# Patient Record
Sex: FEMALE | Race: BLACK OR AFRICAN AMERICAN | NOT HISPANIC OR LATINO | URBAN - METROPOLITAN AREA
[De-identification: names, ages, dates, MRNs, and addresses within clinical notes are randomized per-mention and may not be internally consistent; named-entity substitution may affect disease eponyms.]

---

## 2021-05-21 ENCOUNTER — INPATIENT (INPATIENT)
Facility: HOSPITAL | Age: 41
LOS: 1 days | Discharge: HOME | DRG: 872 | End: 2021-05-23
Attending: STUDENT IN AN ORGANIZED HEALTH CARE EDUCATION/TRAINING PROGRAM | Admitting: STUDENT IN AN ORGANIZED HEALTH CARE EDUCATION/TRAINING PROGRAM
Payer: SELF-PAY

## 2021-05-21 VITALS
HEIGHT: 66 IN | RESPIRATION RATE: 18 BRPM | DIASTOLIC BLOOD PRESSURE: 84 MMHG | TEMPERATURE: 100 F | WEIGHT: 199.96 LBS | SYSTOLIC BLOOD PRESSURE: 119 MMHG | OXYGEN SATURATION: 100 % | HEART RATE: 101 BPM

## 2021-05-21 DIAGNOSIS — R63.8 OTHER SYMPTOMS AND SIGNS CONCERNING FOOD AND FLUID INTAKE: ICD-10-CM

## 2021-05-21 DIAGNOSIS — N12 TUBULO-INTERSTITIAL NEPHRITIS, NOT SPECIFIED AS ACUTE OR CHRONIC: ICD-10-CM

## 2021-05-21 DIAGNOSIS — K40.90 UNILATERAL INGUINAL HERNIA, WITHOUT OBSTRUCTION OR GANGRENE, NOT SPECIFIED AS RECURRENT: Chronic | ICD-10-CM

## 2021-05-21 DIAGNOSIS — A41.9 SEPSIS, UNSPECIFIED ORGANISM: ICD-10-CM

## 2021-05-21 LAB
ALBUMIN SERPL ELPH-MCNC: 3.8 G/DL — SIGNIFICANT CHANGE UP (ref 3.3–5)
ALP SERPL-CCNC: 88 U/L — SIGNIFICANT CHANGE UP (ref 40–120)
ALT FLD-CCNC: 56 U/L — HIGH (ref 10–45)
ANION GAP SERPL CALC-SCNC: 13 MMOL/L — SIGNIFICANT CHANGE UP (ref 5–17)
APPEARANCE UR: CLEAR — SIGNIFICANT CHANGE UP
AST SERPL-CCNC: 43 U/L — HIGH (ref 10–40)
BACTERIA # UR AUTO: PRESENT /HPF
BASOPHILS # BLD AUTO: 0 K/UL — SIGNIFICANT CHANGE UP (ref 0–0.2)
BASOPHILS NFR BLD AUTO: 0 % — SIGNIFICANT CHANGE UP (ref 0–2)
BILIRUB SERPL-MCNC: 1 MG/DL — SIGNIFICANT CHANGE UP (ref 0.2–1.2)
BILIRUB UR-MCNC: ABNORMAL
BUN SERPL-MCNC: 11 MG/DL — SIGNIFICANT CHANGE UP (ref 7–23)
BURR CELLS BLD QL SMEAR: PRESENT — SIGNIFICANT CHANGE UP
CALCIUM SERPL-MCNC: 9.4 MG/DL — SIGNIFICANT CHANGE UP (ref 8.4–10.5)
CHLORIDE SERPL-SCNC: 95 MMOL/L — LOW (ref 96–108)
CO2 SERPL-SCNC: 25 MMOL/L — SIGNIFICANT CHANGE UP (ref 22–31)
COLOR SPEC: YELLOW — SIGNIFICANT CHANGE UP
CREAT SERPL-MCNC: 1.38 MG/DL — HIGH (ref 0.5–1.3)
DIFF PNL FLD: ABNORMAL
EOSINOPHIL # BLD AUTO: 0 K/UL — SIGNIFICANT CHANGE UP (ref 0–0.5)
EOSINOPHIL NFR BLD AUTO: 0 % — SIGNIFICANT CHANGE UP (ref 0–6)
EPI CELLS # UR: SIGNIFICANT CHANGE UP /HPF (ref 0–5)
GIANT PLATELETS BLD QL SMEAR: PRESENT — SIGNIFICANT CHANGE UP
GLUCOSE SERPL-MCNC: 131 MG/DL — HIGH (ref 70–99)
GLUCOSE UR QL: NEGATIVE — SIGNIFICANT CHANGE UP
HCT VFR BLD CALC: 33 % — LOW (ref 34.5–45)
HGB BLD-MCNC: 10.4 G/DL — LOW (ref 11.5–15.5)
KETONES UR-MCNC: 15 MG/DL
LACTATE SERPL-SCNC: 1.1 MMOL/L — SIGNIFICANT CHANGE UP (ref 0.5–2)
LEUKOCYTE ESTERASE UR-ACNC: ABNORMAL
LYMPHOCYTES # BLD AUTO: 27 % — SIGNIFICANT CHANGE UP (ref 13–44)
LYMPHOCYTES # BLD AUTO: 4.58 K/UL — HIGH (ref 1–3.3)
MANUAL SMEAR VERIFICATION: SIGNIFICANT CHANGE UP
MCHC RBC-ENTMCNC: 27.3 PG — SIGNIFICANT CHANGE UP (ref 27–34)
MCHC RBC-ENTMCNC: 31.5 GM/DL — LOW (ref 32–36)
MCV RBC AUTO: 86.6 FL — SIGNIFICANT CHANGE UP (ref 80–100)
MONOCYTES # BLD AUTO: 1.17 K/UL — HIGH (ref 0–0.9)
MONOCYTES NFR BLD AUTO: 6.9 % — SIGNIFICANT CHANGE UP (ref 2–14)
NEUTROPHILS # BLD AUTO: 11.21 K/UL — HIGH (ref 1.8–7.4)
NEUTROPHILS NFR BLD AUTO: 65.2 % — SIGNIFICANT CHANGE UP (ref 43–77)
NEUTS BAND # BLD: 0.9 % — SIGNIFICANT CHANGE UP (ref 0–8)
NITRITE UR-MCNC: NEGATIVE — SIGNIFICANT CHANGE UP
PH UR: 6 — SIGNIFICANT CHANGE UP (ref 5–8)
PLAT MORPH BLD: ABNORMAL
PLATELET # BLD AUTO: 303 K/UL — SIGNIFICANT CHANGE UP (ref 150–400)
POIKILOCYTOSIS BLD QL AUTO: SLIGHT — SIGNIFICANT CHANGE UP
POLYCHROMASIA BLD QL SMEAR: SLIGHT — SIGNIFICANT CHANGE UP
POTASSIUM SERPL-MCNC: 3.8 MMOL/L — SIGNIFICANT CHANGE UP (ref 3.5–5.3)
POTASSIUM SERPL-SCNC: 3.8 MMOL/L — SIGNIFICANT CHANGE UP (ref 3.5–5.3)
PROT SERPL-MCNC: 7.9 G/DL — SIGNIFICANT CHANGE UP (ref 6–8.3)
PROT UR-MCNC: 100 MG/DL
RBC # BLD: 3.81 M/UL — SIGNIFICANT CHANGE UP (ref 3.8–5.2)
RBC # FLD: 15.2 % — HIGH (ref 10.3–14.5)
RBC BLD AUTO: ABNORMAL
RBC CASTS # UR COMP ASSIST: < 5 /HPF — SIGNIFICANT CHANGE UP
SARS-COV-2 RNA SPEC QL NAA+PROBE: SIGNIFICANT CHANGE UP
SODIUM SERPL-SCNC: 133 MMOL/L — LOW (ref 135–145)
SP GR SPEC: 1.01 — SIGNIFICANT CHANGE UP (ref 1–1.03)
UROBILINOGEN FLD QL: 1 E.U./DL — SIGNIFICANT CHANGE UP
WBC # BLD: 16.96 K/UL — HIGH (ref 3.8–10.5)
WBC # FLD AUTO: 16.96 K/UL — HIGH (ref 3.8–10.5)
WBC UR QL: ABNORMAL /HPF

## 2021-05-21 PROCEDURE — 99285 EMERGENCY DEPT VISIT HI MDM: CPT

## 2021-05-21 PROCEDURE — G1004: CPT

## 2021-05-21 PROCEDURE — 99223 1ST HOSP IP/OBS HIGH 75: CPT | Mod: GC

## 2021-05-21 PROCEDURE — 74176 CT ABD & PELVIS W/O CONTRAST: CPT | Mod: 26,MG

## 2021-05-21 RX ORDER — ACETAMINOPHEN 500 MG
650 TABLET ORAL EVERY 6 HOURS
Refills: 0 | Status: DISCONTINUED | OUTPATIENT
Start: 2021-05-21 | End: 2021-05-23

## 2021-05-21 RX ORDER — MORPHINE SULFATE 50 MG/1
4 CAPSULE, EXTENDED RELEASE ORAL ONCE
Refills: 0 | Status: DISCONTINUED | OUTPATIENT
Start: 2021-05-21 | End: 2021-05-21

## 2021-05-21 RX ORDER — SODIUM CHLORIDE 9 MG/ML
500 INJECTION INTRAMUSCULAR; INTRAVENOUS; SUBCUTANEOUS ONCE
Refills: 0 | Status: COMPLETED | OUTPATIENT
Start: 2021-05-21 | End: 2021-05-21

## 2021-05-21 RX ORDER — CEFTRIAXONE 500 MG/1
1000 INJECTION, POWDER, FOR SOLUTION INTRAMUSCULAR; INTRAVENOUS ONCE
Refills: 0 | Status: COMPLETED | OUTPATIENT
Start: 2021-05-21 | End: 2021-05-21

## 2021-05-21 RX ORDER — ACETAMINOPHEN 500 MG
650 TABLET ORAL ONCE
Refills: 0 | Status: COMPLETED | OUTPATIENT
Start: 2021-05-21 | End: 2021-05-21

## 2021-05-21 RX ORDER — CEFTRIAXONE 500 MG/1
2000 INJECTION, POWDER, FOR SOLUTION INTRAMUSCULAR; INTRAVENOUS EVERY 24 HOURS
Refills: 0 | Status: DISCONTINUED | OUTPATIENT
Start: 2021-05-22 | End: 2021-05-23

## 2021-05-21 RX ORDER — ONDANSETRON 8 MG/1
4 TABLET, FILM COATED ORAL ONCE
Refills: 0 | Status: COMPLETED | OUTPATIENT
Start: 2021-05-21 | End: 2021-05-21

## 2021-05-21 RX ORDER — SODIUM CHLORIDE 9 MG/ML
1000 INJECTION INTRAMUSCULAR; INTRAVENOUS; SUBCUTANEOUS ONCE
Refills: 0 | Status: COMPLETED | OUTPATIENT
Start: 2021-05-21 | End: 2021-05-21

## 2021-05-21 RX ADMIN — MORPHINE SULFATE 4 MILLIGRAM(S): 50 CAPSULE, EXTENDED RELEASE ORAL at 17:12

## 2021-05-21 RX ADMIN — MORPHINE SULFATE 4 MILLIGRAM(S): 50 CAPSULE, EXTENDED RELEASE ORAL at 16:42

## 2021-05-21 RX ADMIN — ONDANSETRON 4 MILLIGRAM(S): 8 TABLET, FILM COATED ORAL at 16:42

## 2021-05-21 RX ADMIN — Medication 650 MILLIGRAM(S): at 23:55

## 2021-05-21 RX ADMIN — CEFTRIAXONE 100 MILLIGRAM(S): 500 INJECTION, POWDER, FOR SOLUTION INTRAMUSCULAR; INTRAVENOUS at 18:01

## 2021-05-21 RX ADMIN — SODIUM CHLORIDE 1000 MILLILITER(S): 9 INJECTION INTRAMUSCULAR; INTRAVENOUS; SUBCUTANEOUS at 16:42

## 2021-05-21 RX ADMIN — SODIUM CHLORIDE 1000 MILLILITER(S): 9 INJECTION INTRAMUSCULAR; INTRAVENOUS; SUBCUTANEOUS at 20:49

## 2021-05-21 RX ADMIN — MORPHINE SULFATE 4 MILLIGRAM(S): 50 CAPSULE, EXTENDED RELEASE ORAL at 18:25

## 2021-05-21 RX ADMIN — SODIUM CHLORIDE 1000 MILLILITER(S): 9 INJECTION INTRAMUSCULAR; INTRAVENOUS; SUBCUTANEOUS at 18:08

## 2021-05-21 RX ADMIN — CEFTRIAXONE 100 MILLIGRAM(S): 500 INJECTION, POWDER, FOR SOLUTION INTRAMUSCULAR; INTRAVENOUS at 22:39

## 2021-05-21 RX ADMIN — Medication 650 MILLIGRAM(S): at 20:01

## 2021-05-21 RX ADMIN — Medication 650 MILLIGRAM(S): at 20:30

## 2021-05-21 NOTE — ED ADULT TRIAGE NOTE - CHIEF COMPLAINT QUOTE
Pt BIBA for x5 days of abdominal pain. Endorses +N/V/D, +chills at night. Endorses discomfort when urinating. Denies CP or SOB.

## 2021-05-21 NOTE — H&P ADULT - PROBLEM SELECTOR PLAN 1
-sepsis w/ pyelonephritis on CT a/p right side  -u/a positive  -s/p 2 L giving another 500 cc  -ceftriaxone 2 gm Q24 for now  -f/u urine cx and bld cx

## 2021-05-21 NOTE — H&P ADULT - ASSESSMENT
Patient is a 40 year old female no past medical history or surgical history who presented to the ED with abdominal pain, vomiting, and slight loose stool for the past 5 days.

## 2021-05-21 NOTE — H&P ADULT - HISTORY OF PRESENT ILLNESS
Patient is a 40 year old female no past medical history or surgical history who presented to the ED with abdominal pain, vomiting, and slight loose stool for the past 5 days. She states no undercooked meals during this time. Describes pain in her upper stomach to back. She has dysuria as well, and urinary frequency as well. Denies sick contacts, fevers, chills, nausea, vomiting, or diarrhea.     ED: 103 F, , /83, RR 18.  s/p 2L s/p 1 gm ceftriaxone.

## 2021-05-21 NOTE — H&P ADULT - NSHPLABSRESULTS_GEN_ALL_CORE
LABS:                         10.4   16.96 )-----------( 303      ( 21 May 2021 15:43 )             33.0         133<L>  |  95<L>  |  11  ----------------------------<  131<H>  3.8   |  25  |  1.38<H>    Ca    9.4      21 May 2021 15:43    TPro  7.9  /  Alb  3.8  /  TBili  1.0  /  DBili  x   /  AST  43<H>  /  ALT  56<H>  /  AlkPhos  88        Urinalysis Basic - ( 21 May 2021 16:45 )    Color: Yellow / Appearance: Clear / S.015 / pH: x  Gluc: x / Ketone: 15 mg/dL  / Bili: Small / Urobili: 1.0 E.U./dL   Blood: x / Protein: 100 mg/dL / Nitrite: NEGATIVE   Leuk Esterase: Trace / RBC: < 5 /HPF / WBC Many /HPF   Sq Epi: x / Non Sq Epi: 0-5 /HPF / Bacteria: Present /HPF    Lactate, Blood: 1.1 mmol/L ( @ 20:01)

## 2021-05-21 NOTE — ED PROVIDER NOTE - CLINICAL SUMMARY MEDICAL DECISION MAKING FREE TEXT BOX
41 y/o F with no PMHx presents w/ abdominal pain, vomiting, diarrhea, chills, dysuria, urinary frequency x 5d. Begun after eating takeout. Limited pO intake since. No SOB/cp/cough. Plan for labs, UA. Will give Zofran, Toradol, fluids. 41 y/o F with no PMHx presents w/ abdominal pain, vomiting, diarrhea, chills, dysuria, urinary frequency x 5d. Begun after eating takeout. Limited pO intake since. No SOB/cp/cough. Plan for labs, UA. Will give Zofran, Morphine for pain control. Fluids started upon arrival of patient to ED. 41 y/o F with no PMHx presents w/ abdominal pain, vomiting, diarrhea, chills, dysuria, urinary frequency x 5d. Begun after eating takeout. Limited pO intake since. No SOB/cp/cough. Plan for labs, UA. Will give Zofran, Morphine for pain control. Fluids started upon arrival of patient to ED.  urine positive for uti  because of persistent right sided pain, ct ordered ro stone/pyelo, iv abx started  ct shows pyelo, no stone  pt still in a fair amount of pain will admit

## 2021-05-21 NOTE — ED PROVIDER NOTE - OBJECTIVE STATEMENT
41 y/o F with no PMHx presents to the ED w/ abdominal pain, vomiting, diarrhea, chills x5d. Patient notes she ate [employer] noodles (no rice) prior to getting sick and noone else ate those noodles. Limited pO intake since. Pain is described to upper stomach and radiating to back. Also with associated dysuria, urinary frequency for the same duration of time. No sick contacts, fever, chest pain, SOB, cough, headache, or any other acute complaints.

## 2021-05-22 LAB
-  COAGULASE NEGATIVE STAPHYLOCOCCUS: SIGNIFICANT CHANGE UP
ALBUMIN SERPL ELPH-MCNC: 2.9 G/DL — LOW (ref 3.3–5)
ALP SERPL-CCNC: 83 U/L — SIGNIFICANT CHANGE UP (ref 40–120)
ALT FLD-CCNC: 41 U/L — SIGNIFICANT CHANGE UP (ref 10–45)
ANION GAP SERPL CALC-SCNC: 12 MMOL/L — SIGNIFICANT CHANGE UP (ref 5–17)
AST SERPL-CCNC: 31 U/L — SIGNIFICANT CHANGE UP (ref 10–40)
BASOPHILS # BLD AUTO: 0.03 K/UL — SIGNIFICANT CHANGE UP (ref 0–0.2)
BASOPHILS NFR BLD AUTO: 0.2 % — SIGNIFICANT CHANGE UP (ref 0–2)
BILIRUB SERPL-MCNC: 1 MG/DL — SIGNIFICANT CHANGE UP (ref 0.2–1.2)
BUN SERPL-MCNC: 8 MG/DL — SIGNIFICANT CHANGE UP (ref 7–23)
CALCIUM SERPL-MCNC: 8.6 MG/DL — SIGNIFICANT CHANGE UP (ref 8.4–10.5)
CHLORIDE SERPL-SCNC: 100 MMOL/L — SIGNIFICANT CHANGE UP (ref 96–108)
CO2 SERPL-SCNC: 22 MMOL/L — SIGNIFICANT CHANGE UP (ref 22–31)
CREAT SERPL-MCNC: 1 MG/DL — SIGNIFICANT CHANGE UP (ref 0.5–1.3)
EOSINOPHIL # BLD AUTO: 0.01 K/UL — SIGNIFICANT CHANGE UP (ref 0–0.5)
EOSINOPHIL NFR BLD AUTO: 0.1 % — SIGNIFICANT CHANGE UP (ref 0–6)
GLUCOSE SERPL-MCNC: 159 MG/DL — HIGH (ref 70–99)
GRAM STN FLD: SIGNIFICANT CHANGE UP
HCT VFR BLD CALC: 28.8 % — LOW (ref 34.5–45)
HGB BLD-MCNC: 9.1 G/DL — LOW (ref 11.5–15.5)
IMM GRANULOCYTES NFR BLD AUTO: 1.9 % — HIGH (ref 0–1.5)
LYMPHOCYTES # BLD AUTO: 16.5 % — SIGNIFICANT CHANGE UP (ref 13–44)
LYMPHOCYTES # BLD AUTO: 2.31 K/UL — SIGNIFICANT CHANGE UP (ref 1–3.3)
MAGNESIUM SERPL-MCNC: 2 MG/DL — SIGNIFICANT CHANGE UP (ref 1.6–2.6)
MCHC RBC-ENTMCNC: 27.3 PG — SIGNIFICANT CHANGE UP (ref 27–34)
MCHC RBC-ENTMCNC: 31.6 GM/DL — LOW (ref 32–36)
MCV RBC AUTO: 86.5 FL — SIGNIFICANT CHANGE UP (ref 80–100)
METHOD TYPE: SIGNIFICANT CHANGE UP
MONOCYTES # BLD AUTO: 1.74 K/UL — HIGH (ref 0–0.9)
MONOCYTES NFR BLD AUTO: 12.4 % — SIGNIFICANT CHANGE UP (ref 2–14)
NEUTROPHILS # BLD AUTO: 9.63 K/UL — HIGH (ref 1.8–7.4)
NEUTROPHILS NFR BLD AUTO: 68.9 % — SIGNIFICANT CHANGE UP (ref 43–77)
NRBC # BLD: 0 /100 WBCS — SIGNIFICANT CHANGE UP (ref 0–0)
PLATELET # BLD AUTO: 268 K/UL — SIGNIFICANT CHANGE UP (ref 150–400)
POTASSIUM SERPL-MCNC: 3.7 MMOL/L — SIGNIFICANT CHANGE UP (ref 3.5–5.3)
POTASSIUM SERPL-SCNC: 3.7 MMOL/L — SIGNIFICANT CHANGE UP (ref 3.5–5.3)
PROT SERPL-MCNC: 6.5 G/DL — SIGNIFICANT CHANGE UP (ref 6–8.3)
RBC # BLD: 3.33 M/UL — LOW (ref 3.8–5.2)
RBC # FLD: 15.3 % — HIGH (ref 10.3–14.5)
SODIUM SERPL-SCNC: 134 MMOL/L — LOW (ref 135–145)
WBC # BLD: 13.99 K/UL — HIGH (ref 3.8–10.5)
WBC # FLD AUTO: 13.99 K/UL — HIGH (ref 3.8–10.5)

## 2021-05-22 PROCEDURE — 99232 SBSQ HOSP IP/OBS MODERATE 35: CPT

## 2021-05-22 RX ORDER — POTASSIUM CHLORIDE 20 MEQ
40 PACKET (EA) ORAL ONCE
Refills: 0 | Status: COMPLETED | OUTPATIENT
Start: 2021-05-22 | End: 2021-05-22

## 2021-05-22 RX ORDER — VANCOMYCIN HCL 1 G
1500 VIAL (EA) INTRAVENOUS EVERY 12 HOURS
Refills: 0 | Status: DISCONTINUED | OUTPATIENT
Start: 2021-05-22 | End: 2021-05-23

## 2021-05-22 RX ADMIN — Medication 40 MILLIEQUIVALENT(S): at 17:56

## 2021-05-22 RX ADMIN — Medication 650 MILLIGRAM(S): at 12:00

## 2021-05-22 RX ADMIN — Medication 650 MILLIGRAM(S): at 00:55

## 2021-05-22 RX ADMIN — Medication 650 MILLIGRAM(S): at 06:15

## 2021-05-22 RX ADMIN — Medication 650 MILLIGRAM(S): at 05:16

## 2021-05-22 RX ADMIN — Medication 650 MILLIGRAM(S): at 12:30

## 2021-05-22 RX ADMIN — CEFTRIAXONE 100 MILLIGRAM(S): 500 INJECTION, POWDER, FOR SOLUTION INTRAMUSCULAR; INTRAVENOUS at 17:20

## 2021-05-22 RX ADMIN — Medication 650 MILLIGRAM(S): at 21:12

## 2021-05-22 RX ADMIN — Medication 650 MILLIGRAM(S): at 22:00

## 2021-05-22 NOTE — DISCHARGE NOTE PROVIDER - CARE PROVIDER_API CALL
Linda Mullenhalie  [place], [place]  Phone: [phone]  Fax: (   )    -  Established Patient  Follow Up Time: 2 weeks

## 2021-05-22 NOTE — PROGRESS NOTE ADULT - SUBJECTIVE AND OBJECTIVE BOX
O/N: No acute events. Admitted, see HnP.    Subjective: Patient seen and examined at bedside. Denies fever/chills, chest pain, shortness of breath, abdominal pain, diarrhea. Admits to R flank pain and recent hx of dysuria.    VITAL SIGNS:  T(F): 100.3 (21 @ 05:57)  HR: 105 (21 @ 05:57)  BP: 123/82 (21 @ 05:57)  RR: 18 (21 @ 05:57)  SpO2: 100% (21 @ 05:57)  Wt(kg): --    PHYSICAL EXAM:  Neurologic: AAOx3; CNII-XII grossly intact; no focal deficits  Psychiatric: affect and characteristics of appearance, verbalizations, behaviors are appropriate  Constitutional: WDWN resting comfortably in bed; NAD  Head: NC/AT  Eyes: PERRL, EOMI, clear conjunctiva  ENT: no nasal discharge; uvula midline, no oropharyngeal erythema or exudates; MMM  Neck: supple; no JVD or thyromegaly  Respiratory: CTA B/L, no retractions  Cardiac: +S1/S2; RRR; no M/R/G; PMI non-displaced  Gastrointestinal: soft, NT/ND; no rebound or guarding; +BSx4  Genitourinary: normal external genitalia  Back: spine midline, no bony tenderness or step-offs; no CVAT B/L  Extremities: no clubbing or cyanosis; no peripheral edema  Musculoskeletal: no joint swelling, tenderness or erythema  Vascular: 2+ radial, femoral, DP/PT pulses B/L  Dermatologic: skin warm, dry and intact; no rashes, wounds, or scars  Lymphatic: no submandibular or cervical LAD      MEDICATIONS  (STANDING):  cefTRIAXone   IVPB 2000 milliGRAM(s) IV Intermittent every 24 hours    MEDICATIONS  (PRN):  acetaminophen   Tablet .. 650 milliGRAM(s) Oral every 6 hours PRN Temp greater or equal to 38C (100.4F), Mild Pain (1 - 3)      Allergies    No Known Allergies    Intolerances        LABS:                        9.1    13.99 )-----------( 268      ( 22 May 2021 07:24 )             28.8         134<L>  |  100  |  8   ----------------------------<  159<H>  3.7   |  22  |  1.00    Ca    8.6      22 May 2021 07:24  Mg     2.0     -    TPro  6.5  /  Alb  2.9<L>  /  TBili  1.0  /  DBili  x   /  AST  31  /  ALT  41  /  AlkPhos  83  -      Urinalysis Basic - ( 21 May 2021 16:45 )    Color: Yellow / Appearance: Clear / S.015 / pH: x  Gluc: x / Ketone: 15 mg/dL  / Bili: Small / Urobili: 1.0 E.U./dL   Blood: x / Protein: 100 mg/dL / Nitrite: NEGATIVE   Leuk Esterase: Trace / RBC: < 5 /HPF / WBC Many /HPF   Sq Epi: x / Non Sq Epi: 0-5 /HPF / Bacteria: Present /HPF        RADIOLOGY & ADDITIONAL TESTS: Reviewed

## 2021-05-22 NOTE — PROGRESS NOTE ADULT - ATTENDING COMMENTS
f./u urine c/s  Monitor for sepsis  Discharge planning f./u urine c/s  Monitor for sepsis  PPI  Monitor Hb/hct and tranfuse as needed  Discharge planning

## 2021-05-22 NOTE — DISCHARGE NOTE PROVIDER - NSDCCPCAREPLAN_GEN_ALL_CORE_FT
PRINCIPAL DISCHARGE DIAGNOSIS  Diagnosis: Pyelonephritis of right kidney  Assessment and Plan of Treatment: You were noted to have an infection of the kidney, a condition called pyelonephritis during your admission to [facility]. This was found based on your symptom profile (urinary frequency and/or pain on urination) as well as your urine tested for any infectious organisms. You were received antibiotics throughout your hospital course. Please follow-up with your primary care physician. Should you notice any symptoms such as but not limited to: unrelenting/severe fever (temperatuer >103 F and/or lasting >3 days), worsening pain on urination, urinary discharge, increased urinary frequency, chills, severe flank/lower back pain, or bloody urine, please return to the emergency department for interval evaluation.  Please continue to take _______.         SECONDARY DISCHARGE DIAGNOSES  Diagnosis: Vomiting  Assessment and Plan of Treatment:      PRINCIPAL DISCHARGE DIAGNOSIS  Diagnosis: Pyelonephritis of right kidney  Assessment and Plan of Treatment: You were noted to have an infection of the kidney, a condition called pyelonephritis during your admission to [facility]. This was found based on your symptom profile (urinary frequency and/or pain on urination), imaging of your kidneys, as well as your urine tested for any infectious organisms. You were received antibiotics throughout your hospital course. Should you notice any symptoms such as but not limited to: unrelenting/severe fever (temperature >103 F and/or lasting >3 days), worsening pain on urination, urinary discharge, increased urinary frequency, chills, severe flank/lower back pain, or bloody urine, please return to the emergency department for interval evaluation.  Please continue to take Cefpodoxime 200mg every 12 hours for an additional 8 days (last day 5/30).   Please follow up with your PCP within 2 weeks of discharge.      SECONDARY DISCHARGE DIAGNOSES  Diagnosis: Anemia  Assessment and Plan of Treatment: Anemia is the medical term for when a person has too few red blood cells. Red blood cells are the cells in your blood that carry oxygen. If you have too few red blood cells, your body does not get all the oxygen it needs. Most people with anemia have no symptoms. They find out they have it after their doctor does blood tests for another reason. People who do have symptoms might feel tired or weak, especially if they try to exercise or have headaches. You were found to have anemia during this hospitalization. Please follow up with your PCP for further workup and management of your anemia.

## 2021-05-22 NOTE — DISCHARGE NOTE PROVIDER - NSDCFUADDAPPT_GEN_ALL_CORE_FT
Please make sure you follow up with your Primary Care Provider, AZUCENA Mullen, within 1-2 weeks in [place]. The office is closed at the time of discharge, therefore please make sure you call the office Monday morning (5/24) to schedule the appointment.

## 2021-05-22 NOTE — DISCHARGE NOTE PROVIDER - HOSPITAL COURSE
Patient is a 40 year old female no past medical history or surgical history who presented to the ED with abdominal pain, vomiting, and slight loose stool for the past 5 days due to pylonephritis. Started of CFTX, urine culture growing ______ de-escelated to _________.    #Sepsis, due to unspecified organism, unspecified whether acute organ dysfunction present  At presentation febrile to 103, tachycardic to 101. UA +. Sepsis w/ pyelonephritis on CT a/p right side. S/p 2.5 L. Denies PMH including hx of UTIs. Hepatobiliary etiology less likely given benign liver/GB findings on CT. Urine growing _______.  -c/w ______    #Pyelonephritis of right kidney  -as above.    #Discharge: do not delete    Patient is a 40 year old female no past medical history or surgical history who presented to the ED with abdominal pain, vomiting, and slight loose stool for the past 5 days, found to have sepsis 2/2 pyelonephritis.     Problem List/Main Diagnoses (system-based):   #Sepsis  At presentation febrile to 103, tachycardic to 101. WBC 17. UA +. Sepsis w/ pyelonephritis on CT a/p right side. S/p 2.5 L. Denies PMH including hx of UTIs. Hepatobiliary etiology less likely given benign liver/GB findings on CT.  - Started on Ceftriaxone 2 gm Q24 inpatient  - Urine cx growing E.coli, sensitive to Ceftriaxone   - Clinically improved after IV Ceftriaxone; will transition to Cefpodoxime 200mg q12h to complete 10 day course    #Pyelonephritis of right kidney  Plan as above     #Bacteremia  1/2 blood cultures on admission growing coagulase negative staph. Likely containment. Repeat blood culture obtained with NGTD.     #Anemia  - Hb 10.4 on admission, patient is unsure if she's been diagnosed with anemia in the past  - No signs of bleeding  - Outpatient workup     Inpatient treatment course: Patient is a 40 year old female no past medical history or surgical history who presented to the ED with abdominal pain, vomiting, and slight loose stool for the past 5 days, found to have sepsis 2/2 pyelonephritis. Started on Ceftriaxone 2 gm Q24. Patient's symptoms now improved, with WBC trending now, and remains hemodynamically stable. Urine cx growing E.Coli, sensitive to Ceftriaxone. Will transition to Cefpodoxime 200mg q12h to complete 10 day course. Stable for discharge home to complete course of antibiotics.     New medications: Cefpodoxmine 200mg every 12 hours for 8 more days (last day 5/30)  Labs to be followed outpatient: CBC to monitor anemia   Exam to be followed outpatient: -   #Discharge: do not delete    Patient is a 40 year old female no past medical history or surgical history who presented to the ED with abdominal pain, vomiting, and slight loose stool for the past 5 days, found to have sepsis 2/2 pyelonephritis.     Problem List/Main Diagnoses (system-based):   #Sepsis  At presentation febrile to 103, tachycardic to 101. WBC 17. UA +. Sepsis w/ pyelonephritis on CT a/p right side. S/p 2.5 L. Denies PMH including hx of UTIs. Hepatobiliary etiology less likely given benign liver/GB findings on CT.  - Started on Ceftriaxone 2 gm Q24 inpatient  - Urine cx growing E.coli, sensitive to Ceftriaxone   - Clinically improved after IV Ceftriaxone; will transition to Cefpodoxime 200mg q12h to complete 10 day course    #Pyelonephritis of right kidney  Plan as above     #Bacteremia, coagulase-negative staphylococcal  1/2 blood cultures on admission growing coagulase negative staph. 1 dose of Vanc was initially given, however now discontinued since it's likely a contaminate. Repeat blood culture obtained with NGTD.     #Anemia  - Hb 10.4 on admission, patient is unsure if she's been diagnosed with anemia in the past  - No signs of bleeding  - Outpatient workup     Inpatient treatment course: Patient is a 40 year old female no past medical history or surgical history who presented to the ED with abdominal pain, vomiting, and slight loose stool for the past 5 days, found to have sepsis 2/2 pyelonephritis. Started on Ceftriaxone 2 gm Q24. Patient's symptoms now improved, with WBC trending down, and remains hemodynamically stable. Urine cx growing E.Coli, sensitive to Ceftriaxone. Will transition to Cefpodoxime 200mg q12h to complete 10 day course. Stable for discharge home to complete course of antibiotics.     New medications: Cefpodoxmine 200mg every 12 hours for 8 more days (last day 5/30)  Labs to be followed outpatient: CBC to monitor anemia   Exam to be followed outpatient: -

## 2021-05-22 NOTE — DISCHARGE NOTE PROVIDER - PROVIDER TOKENS
FREE:[LAST:[Sebas],FIRST:[Tiffanie],PHONE:[phone]:[(   [phone]    -],ADDRESS:[[place], [place]],FOLLOWUP:[2 weeks],ESTABLISHEDPATIENT:[T]]

## 2021-05-23 VITALS
HEART RATE: 88 BPM | DIASTOLIC BLOOD PRESSURE: 77 MMHG | TEMPERATURE: 99 F | OXYGEN SATURATION: 100 % | SYSTOLIC BLOOD PRESSURE: 122 MMHG | RESPIRATION RATE: 18 BRPM

## 2021-05-23 DIAGNOSIS — R78.81 BACTEREMIA: ICD-10-CM

## 2021-05-23 DIAGNOSIS — D64.9 ANEMIA, UNSPECIFIED: ICD-10-CM

## 2021-05-23 LAB
-  AMPICILLIN/SULBACTAM: SIGNIFICANT CHANGE UP
-  AMPICILLIN: SIGNIFICANT CHANGE UP
-  CEFAZOLIN: SIGNIFICANT CHANGE UP
-  CEFTRIAXONE: SIGNIFICANT CHANGE UP
-  CIPROFLOXACIN: SIGNIFICANT CHANGE UP
-  ERTAPENEM: SIGNIFICANT CHANGE UP
-  GENTAMICIN: SIGNIFICANT CHANGE UP
-  NITROFURANTOIN: SIGNIFICANT CHANGE UP
-  PIPERACILLIN/TAZOBACTAM: SIGNIFICANT CHANGE UP
-  TOBRAMYCIN: SIGNIFICANT CHANGE UP
-  TRIMETHOPRIM/SULFAMETHOXAZOLE: SIGNIFICANT CHANGE UP
ALBUMIN SERPL ELPH-MCNC: 3.4 G/DL — SIGNIFICANT CHANGE UP (ref 3.3–5)
ALP SERPL-CCNC: 95 U/L — SIGNIFICANT CHANGE UP (ref 40–120)
ALT FLD-CCNC: 51 U/L — HIGH (ref 10–45)
ANION GAP SERPL CALC-SCNC: 15 MMOL/L — SIGNIFICANT CHANGE UP (ref 5–17)
ANISOCYTOSIS BLD QL: SLIGHT — SIGNIFICANT CHANGE UP
AST SERPL-CCNC: 41 U/L — HIGH (ref 10–40)
BASOPHILS # BLD AUTO: 0 K/UL — SIGNIFICANT CHANGE UP (ref 0–0.2)
BASOPHILS NFR BLD AUTO: 0 % — SIGNIFICANT CHANGE UP (ref 0–2)
BILIRUB SERPL-MCNC: 0.6 MG/DL — SIGNIFICANT CHANGE UP (ref 0.2–1.2)
BLD GP AB SCN SERPL QL: NEGATIVE — SIGNIFICANT CHANGE UP
BUN SERPL-MCNC: 8 MG/DL — SIGNIFICANT CHANGE UP (ref 7–23)
CALCIUM SERPL-MCNC: 8.9 MG/DL — SIGNIFICANT CHANGE UP (ref 8.4–10.5)
CHLORIDE SERPL-SCNC: 98 MMOL/L — SIGNIFICANT CHANGE UP (ref 96–108)
CO2 SERPL-SCNC: 23 MMOL/L — SIGNIFICANT CHANGE UP (ref 22–31)
CREAT SERPL-MCNC: 0.92 MG/DL — SIGNIFICANT CHANGE UP (ref 0.5–1.3)
CULTURE RESULTS: SIGNIFICANT CHANGE UP
EOSINOPHIL # BLD AUTO: 0.34 K/UL — SIGNIFICANT CHANGE UP (ref 0–0.5)
EOSINOPHIL NFR BLD AUTO: 2.6 % — SIGNIFICANT CHANGE UP (ref 0–6)
GIANT PLATELETS BLD QL SMEAR: PRESENT — SIGNIFICANT CHANGE UP
GLUCOSE SERPL-MCNC: 104 MG/DL — HIGH (ref 70–99)
GRAM STN FLD: SIGNIFICANT CHANGE UP
HCT VFR BLD CALC: 28.9 % — LOW (ref 34.5–45)
HGB BLD-MCNC: 9 G/DL — LOW (ref 11.5–15.5)
LYMPHOCYTES # BLD AUTO: 2.61 K/UL — SIGNIFICANT CHANGE UP (ref 1–3.3)
LYMPHOCYTES # BLD AUTO: 20.2 % — SIGNIFICANT CHANGE UP (ref 13–44)
MAGNESIUM SERPL-MCNC: 2.3 MG/DL — SIGNIFICANT CHANGE UP (ref 1.6–2.6)
MANUAL SMEAR VERIFICATION: SIGNIFICANT CHANGE UP
MCHC RBC-ENTMCNC: 27 PG — SIGNIFICANT CHANGE UP (ref 27–34)
MCHC RBC-ENTMCNC: 31.1 GM/DL — LOW (ref 32–36)
MCV RBC AUTO: 86.8 FL — SIGNIFICANT CHANGE UP (ref 80–100)
METHOD TYPE: SIGNIFICANT CHANGE UP
MICROCYTES BLD QL: SLIGHT — SIGNIFICANT CHANGE UP
MONOCYTES # BLD AUTO: 1.59 K/UL — HIGH (ref 0–0.9)
MONOCYTES NFR BLD AUTO: 12.3 % — SIGNIFICANT CHANGE UP (ref 2–14)
NEUTROPHILS # BLD AUTO: 8.38 K/UL — HIGH (ref 1.8–7.4)
NEUTROPHILS NFR BLD AUTO: 64.9 % — SIGNIFICANT CHANGE UP (ref 43–77)
ORGANISM # SPEC MICROSCOPIC CNT: SIGNIFICANT CHANGE UP
ORGANISM # SPEC MICROSCOPIC CNT: SIGNIFICANT CHANGE UP
OVALOCYTES BLD QL SMEAR: SLIGHT — SIGNIFICANT CHANGE UP
PHOSPHATE SERPL-MCNC: 2.6 MG/DL — SIGNIFICANT CHANGE UP (ref 2.5–4.5)
PLAT MORPH BLD: ABNORMAL
PLATELET # BLD AUTO: 164 K/UL — SIGNIFICANT CHANGE UP (ref 150–400)
POLYCHROMASIA BLD QL SMEAR: SLIGHT — SIGNIFICANT CHANGE UP
POTASSIUM SERPL-MCNC: 3.6 MMOL/L — SIGNIFICANT CHANGE UP (ref 3.5–5.3)
POTASSIUM SERPL-SCNC: 3.6 MMOL/L — SIGNIFICANT CHANGE UP (ref 3.5–5.3)
PROT SERPL-MCNC: 7 G/DL — SIGNIFICANT CHANGE UP (ref 6–8.3)
RBC # BLD: 3.33 M/UL — LOW (ref 3.8–5.2)
RBC # FLD: 15.1 % — HIGH (ref 10.3–14.5)
RBC BLD AUTO: ABNORMAL
RH IG SCN BLD-IMP: POSITIVE — SIGNIFICANT CHANGE UP
SODIUM SERPL-SCNC: 136 MMOL/L — SIGNIFICANT CHANGE UP (ref 135–145)
SPECIMEN SOURCE: SIGNIFICANT CHANGE UP
WBC # BLD: 12.91 K/UL — HIGH (ref 3.8–10.5)
WBC # FLD AUTO: 12.91 K/UL — HIGH (ref 3.8–10.5)

## 2021-05-23 PROCEDURE — 87150 DNA/RNA AMPLIFIED PROBE: CPT

## 2021-05-23 PROCEDURE — 83735 ASSAY OF MAGNESIUM: CPT

## 2021-05-23 PROCEDURE — 87040 BLOOD CULTURE FOR BACTERIA: CPT

## 2021-05-23 PROCEDURE — 99238 HOSP IP/OBS DSCHRG MGMT 30/<: CPT

## 2021-05-23 PROCEDURE — 86850 RBC ANTIBODY SCREEN: CPT

## 2021-05-23 PROCEDURE — 83605 ASSAY OF LACTIC ACID: CPT

## 2021-05-23 PROCEDURE — 80053 COMPREHEN METABOLIC PANEL: CPT

## 2021-05-23 PROCEDURE — 87186 SC STD MICRODIL/AGAR DIL: CPT

## 2021-05-23 PROCEDURE — 84100 ASSAY OF PHOSPHORUS: CPT

## 2021-05-23 PROCEDURE — 87086 URINE CULTURE/COLONY COUNT: CPT

## 2021-05-23 PROCEDURE — 86901 BLOOD TYPING SEROLOGIC RH(D): CPT

## 2021-05-23 PROCEDURE — 81001 URINALYSIS AUTO W/SCOPE: CPT

## 2021-05-23 PROCEDURE — 74176 CT ABD & PELVIS W/O CONTRAST: CPT

## 2021-05-23 PROCEDURE — 85025 COMPLETE CBC W/AUTO DIFF WBC: CPT

## 2021-05-23 PROCEDURE — 86900 BLOOD TYPING SEROLOGIC ABO: CPT

## 2021-05-23 PROCEDURE — U0005: CPT

## 2021-05-23 PROCEDURE — U0003: CPT

## 2021-05-23 PROCEDURE — 36415 COLL VENOUS BLD VENIPUNCTURE: CPT

## 2021-05-23 PROCEDURE — 86769 SARS-COV-2 COVID-19 ANTIBODY: CPT

## 2021-05-23 PROCEDURE — 99285 EMERGENCY DEPT VISIT HI MDM: CPT | Mod: 25

## 2021-05-23 RX ORDER — POTASSIUM CHLORIDE 20 MEQ
40 PACKET (EA) ORAL ONCE
Refills: 0 | Status: DISCONTINUED | OUTPATIENT
Start: 2021-05-23 | End: 2021-05-23

## 2021-05-23 RX ORDER — CEFPODOXIME PROXETIL 100 MG
1 TABLET ORAL
Qty: 16 | Refills: 0
Start: 2021-05-23 | End: 2021-05-30

## 2021-05-23 RX ORDER — POTASSIUM CHLORIDE 20 MEQ
40 PACKET (EA) ORAL ONCE
Refills: 0 | Status: COMPLETED | OUTPATIENT
Start: 2021-05-23 | End: 2021-05-23

## 2021-05-23 RX ADMIN — Medication 300 MILLIGRAM(S): at 05:49

## 2021-05-23 RX ADMIN — Medication 40 MILLIEQUIVALENT(S): at 09:08

## 2021-05-23 NOTE — DISCHARGE NOTE NURSING/CASE MANAGEMENT/SOCIAL WORK - PATIENT PORTAL LINK FT
You can access the FollowMyHealth Patient Portal offered by [facility] by registering at the following website: [email or web address]. By joining [facility]’s FollowMyHealth portal, you will also be able to view your health information using other applications (apps) compatible with our system.

## 2021-05-23 NOTE — PROGRESS NOTE ADULT - SUBJECTIVE AND OBJECTIVE BOX
*INCOMPLETE* OVERNIGHT EVENTS: 1/2 blood culture growing coagulase negative staph. 1 dose of vanc given however discontinued this morning because it's likely a contaminate.     SUBJECTIVE / INTERVAL HPI: Patient seen and examined at bedside. Reports feeling much better. Denies any fevers/chills, chest pain, SOB, flank pain, abdominal pain, N/V/D.     VITAL SIGNS:  Vital Signs Last 24 Hrs  T(C): 37.7 (23 May 2021 06:49), Max: 38.2 (22 May 2021 22:07)  T(F): 99.9 (23 May 2021 06:49), Max: 100.7 (22 May 2021 22:07)  HR: 87 (23 May 2021 06:49) (71 - 87)  BP: 121/76 (23 May 2021 06:49) (121/76 - 139/83)  BP(mean): --  RR: 19 (23 May 2021 06:49) (17 - 19)  SpO2: 97% (23 May 2021 06:49) (97% - 97%)    PHYSICAL EXAM:  General: Well developed, well nourished, no acute distress  HEENT: NC/AT; PERRL, anicteric sclera; MMM  Neck: Supple  Cardiovascular: +S1/S2, RRR, no murmurs, rubs, gallops  Respiratory: CTA B/L; no W/R/R  : No CVA tenderness   Gastrointestinal: soft, NT/ND; +BSx4  Extremities: WWP; no edema, clubbing or cyanosis  Vascular: 2+ radial, DP/PT pulses B/L  Neurological: AAOx3; no focal deficits    MEDICATIONS:  MEDICATIONS  (STANDING):  cefTRIAXone   IVPB 2000 milliGRAM(s) IV Intermittent every 24 hours    MEDICATIONS  (PRN):  acetaminophen   Tablet .. 650 milliGRAM(s) Oral every 6 hours PRN Temp greater or equal to 38C (100.4F), Mild Pain (1 - 3)      ALLERGIES:  Allergies    No Known Allergies    Intolerances        LABS:                        9.0    12.91 )-----------( 164      ( 23 May 2021 06:11 )             28.9     05-    136  |  98  |  8   ----------------------------<  104<H>  3.6   |  23  |  0.92    Ca    8.9      23 May 2021 06:11  Phos  2.6     -  Mg     2.3     -    TPro  7.0  /  Alb  3.4  /  TBili  0.6  /  DBili  x   /  AST  41<H>  /  ALT  51<H>  /  AlkPhos  95  -      Urinalysis Basic - ( 21 May 2021 16:45 )    Color: Yellow / Appearance: Clear / S.015 / pH: x  Gluc: x / Ketone: 15 mg/dL  / Bili: Small / Urobili: 1.0 E.U./dL   Blood: x / Protein: 100 mg/dL / Nitrite: NEGATIVE   Leuk Esterase: Trace / RBC: < 5 /HPF / WBC Many /HPF   Sq Epi: x / Non Sq Epi: 0-5 /HPF / Bacteria: Present /HPF    CAPILLARY BLOOD GLUCOSE      RADIOLOGY & ADDITIONAL TESTS: Reviewed.

## 2021-05-23 NOTE — PROGRESS NOTE ADULT - ASSESSMENT
Patient is a 40 year old female no past medical history or surgical history who presented to the ED with abdominal pain, vomiting, and slight loose stool for the past 5 days due to pylonephritis
Patient is a 40 year old female no past medical history or surgical history who presented to the ED with abdominal pain, vomiting, and slight loose stool for the past 5 days, found to have sepsis 2/2 pyelonephritis

## 2021-05-23 NOTE — PROGRESS NOTE ADULT - PROBLEM SELECTOR PLAN 4
1/2 blood cultures on admission growing coagulase negative staph. 1 dose of Vanc was initially given, however now discontinued since it's likely a contaminate. Repeat blood culture obtained with NGTD.

## 2021-05-23 NOTE — PROGRESS NOTE ADULT - PROBLEM SELECTOR PLAN 1
At presentation febrile to 103, tachycardic to 101. WBC 17. UA +. Sepsis w/ pyelonephritis on CT a/p right side. S/p 2.5 L. Denies PMH including hx of UTIs. Hepatobiliary etiology less likely given benign liver/GB findings on CT.  - Started on Ceftriaxone 2 gm Q24 inpatient  - Urine cx growing E.coli, sensitive to Ceftriaxone   - Clinically improved after IV Ceftriaxone; will transition to Cefpodoxime 200mg q12h to complete 10 day course
At presentation febrile to 103, tachycardic to 101. UA +. Sepsis w/ pyelonephritis on CT a/p right side. S/p 2.5 L. Denies PMH including hx of UTIs. Hepatobiliary etiology less likely given benign liver/GB findings on CT.  -ceftriaxone 2 gm Q24 for now  -f/u urine cx and bld cx

## 2021-05-23 NOTE — PROGRESS NOTE ADULT - PROBLEM SELECTOR PLAN 5
F-s/p 2.5 L NS  E-replete K<4 and Mag <2   N-Regular diet  FULL code  Dispo: Plan for discharge today

## 2021-05-24 LAB
-  CEFAZOLIN: SIGNIFICANT CHANGE UP
-  CLINDAMYCIN: SIGNIFICANT CHANGE UP
-  ERYTHROMYCIN: SIGNIFICANT CHANGE UP
-  LINEZOLID: SIGNIFICANT CHANGE UP
-  OXACILLIN: SIGNIFICANT CHANGE UP
-  RIFAMPIN: SIGNIFICANT CHANGE UP
-  TRIMETHOPRIM/SULFAMETHOXAZOLE: SIGNIFICANT CHANGE UP
-  VANCOMYCIN: SIGNIFICANT CHANGE UP
COVID-19 SPIKE DOMAIN AB INTERP: NEGATIVE — SIGNIFICANT CHANGE UP
COVID-19 SPIKE DOMAIN ANTIBODY RESULT: 0.4 U/ML — SIGNIFICANT CHANGE UP
CULTURE RESULTS: SIGNIFICANT CHANGE UP
METHOD TYPE: SIGNIFICANT CHANGE UP
ORGANISM # SPEC MICROSCOPIC CNT: SIGNIFICANT CHANGE UP
SARS-COV-2 IGG+IGM SERPL QL IA: 0.4 U/ML — SIGNIFICANT CHANGE UP
SARS-COV-2 IGG+IGM SERPL QL IA: NEGATIVE — SIGNIFICANT CHANGE UP
SPECIMEN SOURCE: SIGNIFICANT CHANGE UP

## 2021-05-26 DIAGNOSIS — A41.9 SEPSIS, UNSPECIFIED ORGANISM: ICD-10-CM

## 2021-05-26 DIAGNOSIS — Z98.890 OTHER SPECIFIED POSTPROCEDURAL STATES: ICD-10-CM

## 2021-05-26 DIAGNOSIS — B96.20 UNSPECIFIED ESCHERICHIA COLI [E. COLI] AS THE CAUSE OF DISEASES CLASSIFIED ELSEWHERE: ICD-10-CM

## 2021-05-26 DIAGNOSIS — R11.2 NAUSEA WITH VOMITING, UNSPECIFIED: ICD-10-CM

## 2021-05-26 DIAGNOSIS — D64.9 ANEMIA, UNSPECIFIED: ICD-10-CM

## 2021-05-26 DIAGNOSIS — N12 TUBULO-INTERSTITIAL NEPHRITIS, NOT SPECIFIED AS ACUTE OR CHRONIC: ICD-10-CM

## 2021-05-26 LAB
CULTURE RESULTS: SIGNIFICANT CHANGE UP
SPECIMEN SOURCE: SIGNIFICANT CHANGE UP

## 2021-05-28 LAB
CULTURE RESULTS: SIGNIFICANT CHANGE UP
SPECIMEN SOURCE: SIGNIFICANT CHANGE UP

## 2022-07-12 NOTE — H&P ADULT - PROBLEM SELECTOR PLAN 3
F-s/p 2.5 L NS  E-replete K<4 and Mag <2   N-regular diet  FULL code Alert and oriented to person, place and time

## 2023-01-01 NOTE — PROGRESS NOTE ADULT - PROBLEM SELECTOR PLAN 3
F-s/p 2.5 L NS  E-replete K<4 and Mag <2   N-regular diet  FULL code
Hb 10.4 on admission, patient is unsure if she's been diagnosed with anemia in the past  - No signs of bleeding  - Outpatient workup
Male

## 2024-05-09 NOTE — ED ADULT NURSE NOTE - NSFALLRSKASSESSDT_ED_ALL_ED
Patient reports nasal drainage, cough, and sore throat. Patient reports her symptoms starting 2 days ago.         
21-May-2021 15:36